# Patient Record
Sex: FEMALE | Race: WHITE | NOT HISPANIC OR LATINO | Employment: OTHER | URBAN - METROPOLITAN AREA
[De-identification: names, ages, dates, MRNs, and addresses within clinical notes are randomized per-mention and may not be internally consistent; named-entity substitution may affect disease eponyms.]

---

## 2021-10-19 ENCOUNTER — HOSPITAL ENCOUNTER (EMERGENCY)
Facility: CLINIC | Age: 81
Discharge: HOME | End: 2021-10-19
Admitting: EMERGENCY MEDICINE
Payer: MEDICARE

## 2021-10-19 VITALS
RESPIRATION RATE: 16 BRPM | OXYGEN SATURATION: 96 % | HEART RATE: 77 BPM | SYSTOLIC BLOOD PRESSURE: 149 MMHG | TEMPERATURE: 98 F | WEIGHT: 161 LBS | DIASTOLIC BLOOD PRESSURE: 82 MMHG

## 2021-10-19 DIAGNOSIS — M62.81 GENERALIZED MUSCLE WEAKNESS: ICD-10-CM

## 2021-10-19 LAB
ALBUMIN UR-MCNC: 10 MG/DL
ANION GAP SERPL CALCULATED.3IONS-SCNC: 7 MMOL/L (ref 5–18)
APPEARANCE UR: CLEAR
ATRIAL RATE - MUSE: 65 BPM
BACTERIA #/AREA URNS HPF: ABNORMAL /HPF
BILIRUB UR QL STRIP: NEGATIVE
BUN SERPL-MCNC: 10 MG/DL (ref 8–28)
CALCIUM SERPL-MCNC: 9.9 MG/DL (ref 8.5–10.5)
CHLORIDE BLD-SCNC: 100 MMOL/L (ref 98–107)
CO2 SERPL-SCNC: 28 MMOL/L (ref 22–31)
COLOR UR AUTO: YELLOW
CREAT SERPL-MCNC: 0.94 MG/DL (ref 0.6–1.1)
DIASTOLIC BLOOD PRESSURE - MUSE: NORMAL MMHG
ERYTHROCYTE [DISTWIDTH] IN BLOOD BY AUTOMATED COUNT: 12.8 % (ref 10–15)
GFR SERPL CREATININE-BSD FRML MDRD: 57 ML/MIN/1.73M2
GLUCOSE BLD-MCNC: 105 MG/DL (ref 70–125)
GLUCOSE UR STRIP-MCNC: NEGATIVE MG/DL
HCT VFR BLD AUTO: 40.6 % (ref 35–47)
HGB BLD-MCNC: 13.1 G/DL (ref 11.7–15.7)
HGB UR QL STRIP: ABNORMAL
HYALINE CASTS: 1 /LPF
INTERPRETATION ECG - MUSE: NORMAL
KETONES UR STRIP-MCNC: NEGATIVE MG/DL
LEUKOCYTE ESTERASE UR QL STRIP: NEGATIVE
MAGNESIUM SERPL-MCNC: 2.4 MG/DL (ref 1.8–2.6)
MCH RBC QN AUTO: 31.1 PG (ref 26.5–33)
MCHC RBC AUTO-ENTMCNC: 32.3 G/DL (ref 31.5–36.5)
MCV RBC AUTO: 96 FL (ref 78–100)
MUCOUS THREADS #/AREA URNS LPF: PRESENT /LPF
NITRATE UR QL: NEGATIVE
P AXIS - MUSE: 52 DEGREES
PH UR STRIP: 7 [PH] (ref 5–7)
PLATELET # BLD AUTO: 224 10E3/UL (ref 150–450)
POTASSIUM BLD-SCNC: 4.7 MMOL/L (ref 3.5–5)
PR INTERVAL - MUSE: 160 MS
QRS DURATION - MUSE: 84 MS
QT - MUSE: 392 MS
QTC - MUSE: 407 MS
R AXIS - MUSE: 44 DEGREES
RBC # BLD AUTO: 4.21 10E6/UL (ref 3.8–5.2)
RBC URINE: 3 /HPF
SODIUM SERPL-SCNC: 135 MMOL/L (ref 136–145)
SP GR UR STRIP: 1.01 (ref 1–1.03)
SQUAMOUS EPITHELIAL: <1 /HPF
SYSTOLIC BLOOD PRESSURE - MUSE: NORMAL MMHG
T AXIS - MUSE: 59 DEGREES
UROBILINOGEN UR STRIP-MCNC: <2 MG/DL
VENTRICULAR RATE- MUSE: 65 BPM
WBC # BLD AUTO: 6.4 10E3/UL (ref 4–11)
WBC URINE: 2 /HPF

## 2021-10-19 PROCEDURE — 99284 EMERGENCY DEPT VISIT MOD MDM: CPT | Mod: 25

## 2021-10-19 PROCEDURE — 36415 COLL VENOUS BLD VENIPUNCTURE: CPT | Performed by: EMERGENCY MEDICINE

## 2021-10-19 PROCEDURE — 85014 HEMATOCRIT: CPT | Performed by: EMERGENCY MEDICINE

## 2021-10-19 PROCEDURE — 80048 BASIC METABOLIC PNL TOTAL CA: CPT | Performed by: EMERGENCY MEDICINE

## 2021-10-19 PROCEDURE — 81003 URINALYSIS AUTO W/O SCOPE: CPT | Performed by: EMERGENCY MEDICINE

## 2021-10-19 PROCEDURE — 93005 ELECTROCARDIOGRAM TRACING: CPT | Performed by: EMERGENCY MEDICINE

## 2021-10-19 PROCEDURE — 83735 ASSAY OF MAGNESIUM: CPT | Performed by: EMERGENCY MEDICINE

## 2021-10-19 ASSESSMENT — ENCOUNTER SYMPTOMS
NERVOUS/ANXIOUS: 0
DYSURIA: 0
MYALGIAS: 0
PALPITATIONS: 0
SHORTNESS OF BREATH: 0
VOMITING: 0
HEMATURIA: 0
DIARRHEA: 0
BLOOD IN STOOL: 0
ABDOMINAL PAIN: 0
COUGH: 0
NAUSEA: 0
CHILLS: 0
NUMBNESS: 0
FEVER: 0
APPETITE CHANGE: 0

## 2021-10-19 NOTE — DISCHARGE INSTRUCTIONS
You were seen in the emergency department today for evaluation of weakness.  Your lab work today is reassuring with no evidence of infection in your blood or urine, electrolyte problems, or kidney dysfunction.  Your EKG was unremarkable appearing.    As we discussed, your urine is being sent for culture.  Someone will call you if that returns positive and you require antibiotics.    Please follow-up with your primary care provider, call and let them know you need to follow-up after an ER visit.  Return to the ER if you develop any new or worsening symptoms like severe pain, fever, persistent vomiting, weakness on one side of your body, slurred speech, facial droop, chest pain, or any other concerning symptoms.

## 2021-10-19 NOTE — ED PROVIDER NOTES
EMERGENCY DEPARTMENT ENCOUNTER      NAME: Carina Castillo V  AGE: 80 year old female  YOB: 1940  MRN: [id number]  EVALUATION DATE & TIME: 10/19/2021  1:15 PM    PCP: No primary care provider on file.    ED PROVIDER: Marycarmen Martin PA-C      No chief complaint on file.        FINAL IMPRESSION:  1. Generalized muscle weakness          ED COURSE & MEDICAL DECISION MAKING:    Pertinent Labs & Imaging studies reviewed. (See chart for details)    80 year old female presents to the Emergency Department for evaluation of generalized muscle weakness.    Physical exam is remarkable for a well-appearing female who is in no acute distress.  Heart and lung sounds clear diffusely throughout.  Abdomen soft and nontender.  No focal neurologic deficits, cranial nerves III through XII appear grossly intact.  Strength is 5 out of 5 in the upper and lower extremities bilaterally.  Vital signs are stable and she is afebrile.  Of note, there is a documented oxygen of 16% which is erroneous.  Repeat oxygenation 96% on room air.    CBC is unremarkable with no leukocytosis or anemia.  BMP is unremarkable with no significant electrolyte derangements, normal kidney function.  Exam within normal limits.  Urinalysis with a small amount of blood and bacteria, no significant evidence of infection.    I do not think any further emergent labs or imaging are indicated at this time.  The patient has an unremarkable neurological exam with no focal deficits.  Low concern for stroke at this time, she denies any stroke symptoms.  She denies any chest pain or shortness of breath concerning for atypical ACS, EKG without acute ischemic changes.  Abdomen is completely benign on exam, lab work is overall reassuring.  We discussed empiric antibiotic treatment for UTI based on urinalysis versus waiting for culture, patient would prefer to wait which I think is very reasonable since she is not having any urinary symptoms.  Advised patient to  "follow-up with her primary care provider for recheck later this week, recommend return to the ER if she develops any new or worsening symptoms.  The patient is agreeable with this treatment plan and verbalized her understanding.    ED Course   1:25 PM Performed my initial history and physical exam. Discussed workup in the emergency department, management of symptoms, and likely disposition. PPE: Provider wore eye protection, surgical mask and gloves.   2:54 PM I rechecked and updated the patient. She feels good. I discussed the plan for discharge with the patient, and patient is agreeable. We discussed supportive cares at home and reasons for return to the ER including new or worsening symptoms - all questions and concerns addressed. Patient to be discharged by RN.    At the conclusion of the encounter I discussed the results of all of the tests and the disposition. The questions were answered. The patient or family acknowledged understanding and was agreeable with the care plan.     Voice recognition software was used in the creation of this note. Any grammatical or nonsensical errors are due to inherent errors with the software and are not the intention of the writer.     MEDICATIONS GIVEN IN THE EMERGENCY:  Medications - No data to display    NEW PRESCRIPTIONS STARTED AT TODAY'S ER VISIT  New Prescriptions    No medications on file            =================================================================    HPI    Patient information was obtained from: Patient     Use of Intrepreter: N/A         Carina Castillo V is a 80 year old female with a pertinent medical history of hyperlipidemia and arthritis who presents to this ED by walk in brought in by her neighbor for evaluation of generalized weakness.    Patient reports intermittent episodes of \"weakness going through my body\" for the past 4 days. She specifically indicates this feeling of weakness in her bilateral arms. Patient states that she has several of " "these episodes per day; she does not identify any provoking factors. No reported recent falls. She has been taking Tylenol with no relief. She denies any significant pain. Patient notes that she has a remote history of an anxiety attack 30+ years ago when she had a similar sense of \"not feeling good.\" She states that she does not feel anxious currently and she has not had recently increased stress. Patient has been eating and drinking well. She notes that she regularly does an exercise program [facility] the [facility]. She denies fever, chills, chest pain, palpitations, shortness of breath, cough, nausea, vomiting, abdominal pain, diarrhea, bloody stools, dysuria, hematuria, numbness/tingling, or additional symptoms at this time. Of note, the patient received her COVID-19 booster shot on 10/9/2021, several days prior to onset of her current symptoms. She reports no side effects from the vaccine.         REVIEW OF SYSTEMS   Review of Systems   Constitutional: Negative for appetite change, chills and fever.        Positive for generalized weakness (most noticeable in bilateral arms)   Respiratory: Negative for cough and shortness of breath.    Cardiovascular: Negative for chest pain and palpitations.   Gastrointestinal: Negative for abdominal pain, blood in stool, diarrhea, nausea and vomiting.   Genitourinary: Negative for dysuria and hematuria.   Musculoskeletal: Negative for myalgias.   Neurological: Negative for numbness.   Psychiatric/Behavioral: The patient is not nervous/anxious.      All other systems reviewed and are negative unless noted in HPI.       PAST MEDICAL HISTORY:  History reviewed. No pertinent past medical history.    PAST SURGICAL HISTORY:  History reviewed. No pertinent surgical history.    CURRENT MEDICATIONS:    No current outpatient medications on file.      ALLERGIES:  No Known Allergies    FAMILY HISTORY:  History reviewed. No pertinent family history.    SOCIAL HISTORY:   Social History "     Socioeconomic History     Marital status: Not on file     Spouse name: Not on file     Number of children: Not on file     Years of education: Not on file     Highest education level: Not on file   Occupational History     Not on file   Tobacco Use     Smoking status: Not on file   Substance and Sexual Activity     Alcohol use: Not on file     Drug use: Not on file     Sexual activity: Not on file   Other Topics Concern     Not on file   Social History Narrative     Not on file     Social Determinants of Health     Financial Resource Strain:      Difficulty of Paying Living Expenses:    Food Insecurity:      Worried About Running Out of Food in the Last Year:      Ran Out of Food in the Last Year:    Transportation Needs:      Lack of Transportation (Medical):      Lack of Transportation (Non-Medical):    Physical Activity:      Days of Exercise per Week:      Minutes of Exercise per Session:    Stress:      Feeling of Stress :    Social Connections:      Frequency of Communication with Friends and Family:      Frequency of Social Gatherings with Friends and Family:      Attends [employer] Services:      Active Member of Clubs or Organizations:      Attends Club or Organization Meetings:      Marital Status:    Intimate Partner Violence:      Fear of Current or Ex-Partner:      Emotionally Abused:      Physically Abused:      Sexually Abused:        VITALS:  Patient Vitals for the past 24 hrs:   BP Temp Temp src Pulse Resp SpO2 Weight   10/19/21 1348 -- -- -- -- -- 96 % --   10/19/21 1313 (!) 149/82 98  F (36.7  C) Oral 77 16 (!) 16 % 73 kg (161 lb)       PHYSICAL EXAM    VITAL SIGNS: BP (!) 149/82   Pulse 77   Temp 98  F (36.7  C) (Oral)   Resp 16   Wt 73 kg (161 lb)   SpO2 96%   General Appearance: Alert, cooperative, normal speech and facial symmetry, appears stated age, the patient does not appear in distress  Head:  Normocephalic, without obvious abnormality, atraumatic  Eyes: Conjunctiva/corneas clear,  EOM's intact, no nystagmus, PERRL  ENT:  Lips, mucosa, and tongue normal; teeth and gums normal, no pharyngeal inflammation, no dysphonia or difficulty swallowing, membranes are moist without pallor  Neck:  Supple, symmetrical, trachea midline  Cardio:  Regular rate and rhythm, S1 and S2 normal, no murmur, rub    or gallop, 2+ pulses symmetric in all extremities  Pulm:  Clear to auscultation bilaterally, respirations unlabored with no accessory muscle use  Abdomen:  Abdomen is soft, non-distended with no tenderness to palpation, rebound tenderness, or guarding.   Extremities:  Extremities normal, there is no tenderness to palpation , atraumatic, no cyanosis or edema, full function and range of motion, pulses equal in all extremities, normal cap refill, no joint swelling; strength is 5/5 in the upper and lower extremities bilaterally  Neuro: Patient is awake, alert, and responsive to voice. No gross motor weaknesses or sensory loss; moves all extremities. Cranial Nerves:  CN2: No funduscopic exam performed. CN3,4 & 6: Pupillary light response, lateral and vertical gaze normal.  No nystagmus.  CN7: No facial weakness, smile, facial symmetry intact. CN8: Intact to spoken voice. CN9&10: Gag reflex, uvula midline, palate rises with phonation. CN11: Shoulder shrug 5/5 intact bilaterally. CN12: Tongue midline and moves freely from side to side.       LAB:  All pertinent labs reviewed and interpreted.  Labs Ordered and Resulted from Time of ED Arrival Up to the Time of Departure from the ED   BASIC METABOLIC PANEL - Abnormal; Notable for the following components:       Result Value    Sodium 135 (*)     GFR Estimate 57 (*)     All other components within normal limits   ROUTINE UA WITH MICROSCOPIC REFLEX TO CULTURE - Abnormal; Notable for the following components:    Blood Urine 0.03 mg/dL (*)     Protein Albumin Urine 10  (*)     Bacteria Urine Few (*)     Mucus Urine Present (*)     RBC Urine 3 (*)     All other  components within normal limits    Narrative:     Urine Culture not indicated   CBC WITH PLATELETS - Normal   MAGNESIUM - Normal       RADIOLOGY:  Reviewed all pertinent imaging. Please see official radiology report.  No orders to display       EKG:    Performed at: 14:02    I have independently reviewed and interpreted the EKG, along with the final read. EKG also reviewed by Dr. Sierra.    Ventricular rate 65 bpm  CO interval 160 ms  QRS duration 84 ms  QT/QTc 392/407  P-R-T axes 52  44  59    Impression: Sinus rhythm with premature ventricular complexes. Nonspecific ST abnormality. No STEMI. Abnormal ECG. No previous ECGs available for comparison.       I, Carlita Live, am serving as a scribe to document services personally performed by Marycarmen Martin PA-C based on my observation and the provider's statements to me. I, Marycarmen Martin PA-C attest that Carlita Live is acting in a scribe capacity, has observed my performance of the services and has documented them in accordance with my direction.     Marycarmen Martin PA-C  Emergency Medicine  [facility] EMERGENCY ROOM  [address] [address]  [phone]  Dept: [phone]     Marycarmen Martin PA-C  10/19/21 6271

## 2021-10-19 NOTE — ED TRIAGE NOTES
Patient is here with pain and weakness bilateral arms. She reports the pain comes and goes. This started on Thursday. She reported taking plants out and then feeling the pain. She did have her third covid shot one week prior to the symptoms.